# Patient Record
Sex: FEMALE | Race: OTHER | URBAN - METROPOLITAN AREA
[De-identification: names, ages, dates, MRNs, and addresses within clinical notes are randomized per-mention and may not be internally consistent; named-entity substitution may affect disease eponyms.]

---

## 2018-06-06 ENCOUNTER — OFFICE VISIT (OUTPATIENT)
Dept: FAMILY MEDICINE CLINIC | Age: 33
End: 2018-06-06

## 2018-06-06 VITALS
WEIGHT: 127.2 LBS | SYSTOLIC BLOOD PRESSURE: 100 MMHG | BODY MASS INDEX: 25.34 KG/M2 | HEART RATE: 96 BPM | TEMPERATURE: 98.8 F | OXYGEN SATURATION: 99 % | DIASTOLIC BLOOD PRESSURE: 68 MMHG

## 2018-06-06 DIAGNOSIS — H66.91 ACUTE RIGHT OTITIS MEDIA: ICD-10-CM

## 2018-06-06 DIAGNOSIS — R05.9 COUGH: Primary | ICD-10-CM

## 2018-06-06 PROCEDURE — 99213 OFFICE O/P EST LOW 20 MIN: CPT | Performed by: FAMILY MEDICINE

## 2018-06-06 RX ORDER — AMOXICILLIN AND CLAVULANATE POTASSIUM 875; 125 MG/1; MG/1
1 TABLET, FILM COATED ORAL 2 TIMES DAILY
Qty: 20 TABLET | Refills: 0 | Status: SHIPPED | OUTPATIENT
Start: 2018-06-06 | End: 2018-06-16

## 2018-06-06 RX ORDER — BENZONATATE 100 MG/1
100 CAPSULE ORAL 3 TIMES DAILY PRN
Qty: 20 CAPSULE | Refills: 0 | Status: SHIPPED | OUTPATIENT
Start: 2018-06-06 | End: 2018-06-13

## 2018-06-06 ASSESSMENT — PATIENT HEALTH QUESTIONNAIRE - PHQ9
SUM OF ALL RESPONSES TO PHQ QUESTIONS 1-9: 0
2. FEELING DOWN, DEPRESSED OR HOPELESS: 0
1. LITTLE INTEREST OR PLEASURE IN DOING THINGS: 0
SUM OF ALL RESPONSES TO PHQ9 QUESTIONS 1 & 2: 0

## 2021-06-27 ENCOUNTER — HOSPITAL ENCOUNTER (EMERGENCY)
Age: 36
Discharge: HOME | End: 2021-06-27

## 2021-06-27 ENCOUNTER — APPOINTMENT (OUTPATIENT)
Dept: ULTRASOUND IMAGING | Age: 36
End: 2021-06-27

## 2021-06-27 VITALS
BODY MASS INDEX: 26.68 KG/M2 | DIASTOLIC BLOOD PRESSURE: 49 MMHG | OXYGEN SATURATION: 99 % | RESPIRATION RATE: 16 BRPM | SYSTOLIC BLOOD PRESSURE: 99 MMHG | TEMPERATURE: 98.6 F | HEART RATE: 72 BPM | HEIGHT: 62 IN | WEIGHT: 145 LBS

## 2021-06-27 DIAGNOSIS — O46.90 VAGINAL BLEEDING IN PREGNANCY: Primary | ICD-10-CM

## 2021-06-27 LAB
ABO/RH: NORMAL
ANION GAP SERPL CALCULATED.3IONS-SCNC: 10 MMOL/L (ref 3–16)
BASOPHILS ABSOLUTE: 0 K/UL (ref 0–0.2)
BASOPHILS RELATIVE PERCENT: 0.5 %
BILIRUBIN URINE: NEGATIVE
BLOOD, URINE: NEGATIVE
BUN BLDV-MCNC: 13 MG/DL (ref 7–20)
CALCIUM SERPL-MCNC: 9.1 MG/DL (ref 8.3–10.6)
CHLORIDE BLD-SCNC: 103 MMOL/L (ref 99–110)
CLARITY: CLEAR
CO2: 22 MMOL/L (ref 21–32)
COLOR: YELLOW
CREAT SERPL-MCNC: <0.5 MG/DL (ref 0.6–1.1)
EOSINOPHILS ABSOLUTE: 0.2 K/UL (ref 0–0.6)
EOSINOPHILS RELATIVE PERCENT: 3 %
GFR AFRICAN AMERICAN: >60
GFR NON-AFRICAN AMERICAN: >60
GLUCOSE BLD-MCNC: 69 MG/DL (ref 70–99)
GLUCOSE URINE: 100 MG/DL
GONADOTROPIN, CHORIONIC (HCG) QUANT: NORMAL MIU/ML
HCT VFR BLD CALC: 36.4 % (ref 36–48)
HEMOGLOBIN: 12.2 G/DL (ref 12–16)
KETONES, URINE: ABNORMAL MG/DL
LEUKOCYTE ESTERASE, URINE: NEGATIVE
LYMPHOCYTES ABSOLUTE: 1.8 K/UL (ref 1–5.1)
LYMPHOCYTES RELATIVE PERCENT: 26.3 %
MCH RBC QN AUTO: 29.3 PG (ref 26–34)
MCHC RBC AUTO-ENTMCNC: 33.5 G/DL (ref 31–36)
MCV RBC AUTO: 87.7 FL (ref 80–100)
MICROSCOPIC EXAMINATION: ABNORMAL
MONOCYTES ABSOLUTE: 0.7 K/UL (ref 0–1.3)
MONOCYTES RELATIVE PERCENT: 10.1 %
NEUTROPHILS ABSOLUTE: 4.2 K/UL (ref 1.7–7.7)
NEUTROPHILS RELATIVE PERCENT: 60.1 %
NITRITE, URINE: NEGATIVE
PDW BLD-RTO: 13.8 % (ref 12.4–15.4)
PH UA: 6.5 (ref 5–8)
PLATELET # BLD: 245 K/UL (ref 135–450)
PMV BLD AUTO: 8 FL (ref 5–10.5)
POTASSIUM SERPL-SCNC: 4.1 MMOL/L (ref 3.5–5.1)
PROTEIN UA: NEGATIVE MG/DL
RBC # BLD: 4.16 M/UL (ref 4–5.2)
SODIUM BLD-SCNC: 135 MMOL/L (ref 136–145)
SPECIFIC GRAVITY UA: >1.03 (ref 1–1.03)
URINE REFLEX TO CULTURE: ABNORMAL
URINE TYPE: ABNORMAL
UROBILINOGEN, URINE: 1 E.U./DL
WBC # BLD: 6.9 K/UL (ref 4–11)

## 2021-06-27 PROCEDURE — 81003 URINALYSIS AUTO W/O SCOPE: CPT

## 2021-06-27 PROCEDURE — 86900 BLOOD TYPING SEROLOGIC ABO: CPT

## 2021-06-27 PROCEDURE — 80048 BASIC METABOLIC PNL TOTAL CA: CPT

## 2021-06-27 PROCEDURE — 85025 COMPLETE CBC W/AUTO DIFF WBC: CPT

## 2021-06-27 PROCEDURE — 99282 EMERGENCY DEPT VISIT SF MDM: CPT

## 2021-06-27 PROCEDURE — 86901 BLOOD TYPING SEROLOGIC RH(D): CPT

## 2021-06-27 PROCEDURE — 76817 TRANSVAGINAL US OBSTETRIC: CPT

## 2021-06-27 PROCEDURE — 84702 CHORIONIC GONADOTROPIN TEST: CPT

## 2021-06-27 ASSESSMENT — ENCOUNTER SYMPTOMS
VOMITING: 0
RHINORRHEA: 0
DIARRHEA: 0
COUGH: 0
ABDOMINAL PAIN: 0
SHORTNESS OF BREATH: 0
NAUSEA: 0

## 2021-06-27 NOTE — ED PROVIDER NOTES
[address] ENCOUNTER        Pt Name: Alonzo Camarillo  MRN: [id number]  [place] 1985  Date of evaluation: 6/27/2021  Provider: Skip Wright PA-C  PCP: Thomas Dawson MD  Note Started: 2:59 PM EDT       COLE. I have evaluated this patient. My supervising physician was available for consultation. CHIEF COMPLAINT       Chief Complaint   Patient presents with    Vaginal Bleeding     complains of vaginal bleeding onset yesterday. HISTORY OF PRESENT ILLNESS   (Location, Timing/Onset, Context/Setting, Quality, Duration, Modifying Factors, Severity, Associated Signs and Symptoms)  Note limiting factors. The patient's preferred language is Grenadian, language lines are used for interpretation,  #[id number]    Chief Complaint: Vaginal bleeding and pregnant    Alonzo Camarillo is a 38 y.o. female who presents to the emergency department today for evaluation for vaginal bleeding in pregnancy. The patient tells me that her last menstrual cycle was on 5/31/2021. The patient states that her menstrual cycle is late, and she states that today when she went to the bathroom, she states that she noticed some spotting. She states that she has not had any vaginal bleeding or discharge since. The patient states that she took a pregnancy test at home, which was positive. G5, P3. The patient does not have any abdominal pain or cramping. She has no nausea, vomiting or diarrhea. No fever chills per no cough or congestion. No urinary symptoms, no other complaints. Nursing Notes were all reviewed and agreed with or any disagreements were addressed in the HPI. REVIEW OF SYSTEMS    (2-9 systems for level 4, 10 or more for level 5)     Review of Systems   Constitutional: Negative for activity change, appetite change, chills and fever. HENT: Negative for congestion and rhinorrhea.     Respiratory: Negative for cough and shortness of breath. Cardiovascular: Negative for chest pain. Gastrointestinal: Negative for abdominal pain, diarrhea, nausea and vomiting. Genitourinary: Positive for vaginal bleeding. Negative for difficulty urinating, dysuria and hematuria. Neurological: Negative for weakness. Positives and Pertinent negatives as per HPI. Except as noted above in the ROS, all other systems were reviewed and negative. PAST MEDICAL HISTORY   History reviewed. No pertinent past medical history. SURGICAL HISTORY     Past Surgical History:   Procedure Laterality Date     SECTION           CURRENTMEDICATIONS       Previous Medications    No medications on file         ALLERGIES     Patient has no known allergies. FAMILYHISTORY       Family History   Family history unknown: Yes          SOCIAL HISTORY       Social History     Tobacco Use    Smoking status: Never Smoker    Smokeless tobacco: Never Used   Substance Use Topics    Alcohol use: No    Drug use: No       SCREENINGS             PHYSICAL EXAM    (up to 7 for level 4, 8 or more for level 5)     ED Triage Vitals [21 1122]   BP Temp Temp Source Pulse Resp SpO2 Height Weight   (!) 119/51 98.6 °F (37 °C) Oral 72 16 99 % 5' 2\" (1.575 m) 145 lb (65.8 kg)       Physical Exam  Vitals and nursing note reviewed. Constitutional:       Appearance: She is well-developed. She is not diaphoretic. HENT:      Head: Normocephalic and atraumatic. Right Ear: External ear normal.      Left Ear: External ear normal.      Nose: Nose normal.   Eyes:      General:         Right eye: No discharge. Left eye: No discharge. Neck:      Trachea: No tracheal deviation. Cardiovascular:      Rate and Rhythm: Normal rate and regular rhythm. Heart sounds: No murmur heard. Pulmonary:      Effort: Pulmonary effort is normal. No respiratory distress. Breath sounds: Normal breath sounds. No wheezing or rales.    Abdominal:      General: Bowel sounds are normal. There is no distension. Palpations: Abdomen is soft. Tenderness: There is no abdominal tenderness. There is no guarding. Genitourinary:     Comments: Chaperone present for exam, cervical os is closed. No vaginal bleeding noted   Musculoskeletal:         General: Normal range of motion. Cervical back: Normal range of motion and neck supple. Skin:     General: Skin is warm and dry. Neurological:      Mental Status: She is alert and oriented to person, place, and time. Psychiatric:         Behavior: Behavior normal.         DIAGNOSTIC RESULTS   LABS:    Labs Reviewed   BASIC METABOLIC PANEL - Abnormal; Notable for the following components:       Result Value    Sodium 135 (*)     Glucose 69 (*)     CREATININE <0.5 (*)     All other components within normal limits    Narrative:     Performed at:  [facility] [address]   Phone [phone]   URINE RT REFLEX TO CULTURE - Abnormal; Notable for the following components:    Glucose, Ur 100 (*)     Ketones, Urine TRACE (*)     All other components within normal limits    Narrative:     Performed at:  [facility] [address] [facility]   Phone [phone]   CBC WITH AUTO DIFFERENTIAL    Narrative:     Performed at:  [facility] [address] [facility]   Phone [phone]   HCG, QUANTITATIVE, PREGNANCY    Narrative:     Performed at:  [facility] [address] [place]   Phone [phone]   ABO/RH    Narrative:     Performed at:  [facility] [address] [facility]   Phone [phone]       All other labs were within normal range or not returned as of this dictation. EKG:  All EKG's are interpreted by the Emergency Department Physician in the absence of a cardiologist.  Please see their note for interpretation of EKG. RADIOLOGY:   Non-plain film images such as CT, Ultrasound and MRI are read by the radiologist. Plain radiographic images are visualized and preliminarily interpreted by the ED Provider with the below findings:        Interpretation per the Radiologist below, if available at the time of this note:    US OB TRANSVAGINAL   Final Result   1. Single live intrauterine gestation with an estimated fetal age of 10 weeks   and 6 days. US OB TRANSVAGINAL    Result Date: 6/27/2021  EXAMINATION: FIRST TRIMESTER OBSTETRIC ULTRASOUND 6/27/2021 TECHNIQUE: Transvaginal first trimester obstetric pelvic ultrasound was performed with color Doppler flow evaluation. COMPARISON: None HISTORY: ORDERING SYSTEM PROVIDED HISTORY: r/o ectopic TECHNOLOGIST PROVIDED HISTORY: Reason for exam:->r/o ectopic Reason for Exam: r/o ectopic Acuity: Acute Type of Exam: Initial Additional signs and symptoms: patient states vaginal bleed spotting x one day Relevant Medical/Surgical History: patient states here to confirm pregnancy FINDINGS: Uterus: The gravid uterus is unremarkable measuring 11.3 x 5.5 x 6.3 cm Gestational Sac(s):  Single normal appearing gestational sac. No evidence of subchorionic hemorrhage. Yolk Sac:  Present Fetal Pole:  Single fetal pole Crown Rump Length:  3 mm Fetal Heart Rate:  92 beats per minute Right ovary: 2.3 x 1.7 x 1.6 cm. There is no evidence of ovarian torsion. Left ovary: Not visualized. Free fluid: A trace amount of physiologic free fluid is present in the cul de sac. Measurements: Estimated gestational age by current ultrasound: 5 weeks and 6 days Estimated gestational by LMP/prior ultrasound: 3 weeks and 6 days Estimated Due Date: 02/21/2022     1. Single live intrauterine gestation with an estimated fetal age of 10 weeks and 6 days.            PROCEDURES   Unless otherwise noted below, none     Procedures    CRITICAL CARE TIME N/A    CONSULTS:  None      EMERGENCY DEPARTMENT COURSE and DIFFERENTIAL DIAGNOSIS/MDM:   Vitals:    Vitals:    06/27/21 1122 06/27/21 1445   BP: (!) 119/51 (!) 99/49   Pulse: 72    Resp: 16    Temp: 98.6 °F (37 °C)    TempSrc: Oral    SpO2: 99%    Weight: 145 lb (65.8 kg)    Height: 5' 2\" (1.575 m)        Patient was given the following medications:  Medications - No data to display        Briefly, this is a 70-year-old female who presents to the emergency department today for evaluation for vaginal bleeding in pregnancy. The patient tells me that her last menstrual cycle was on 5/31/2021, today she had some some spotting. She states that she took a pregnancy test which was positive. G5, P3. Patient has no abdominal pain. She does not feel that she is bleeding at this time. On physical exam, her cervical os is closed, there is no bleeding noted. Abdomen is benign. CBC shows no evidence of leukocytosis or anemia. Her BMP shows glucose of 69 however she is able to tolerate p.o. intake without difficulty. He[place] Mcdonald is [id number], she is O+. Urine shows no evidence of infection or blood    Ultrasound shows a single live IUP with estimated fetal age of 5 weeks and 6 days. Patient has not had any bleeding or cramping while in the emergency room she does have a confirmed IUP and therefore I feel that she can be managed as an outpatient. She states that she will obtain prenatal vitamins over-the-counter. She was referred to the [facility] clinic within the next 2 to 3 days for reevaluation. She is to return to the ED for any new or worsening symptoms. Patient voiced understanding is agreeable with plan. Stable for discharge. My suspicion is low at this time for ectopic pregnancy, tubo-ovarian abscess, ovarian torsion, PID, acute blood loss anemia or other emergent etiology      FINAL IMPRESSION      1.  Vaginal bleeding in pregnancy          DISPOSITION/PLAN   DISPOSITION Decision To Discharge 06/27/2021 03:07:24 PM      PATIENT REFERRED TO:  [facility] [address] [place] [address]  Schedule an appointment as soon as possible for a visit in 2 days      [facility] Emergency Department  [address] [facility]  [phone]    If symptoms worsen, As needed      DISCHARGE MEDICATIONS:  New Prescriptions    No medications on file       DISCONTINUED MEDICATIONS:  Discontinued Medications    No medications on file              (Please note that portions of this note were completed with a voice recognition program.  Efforts were made to edit the dictations but occasionally words are mis-transcribed.)    Ivette Contreras PA-C (electronically signed)            Ivette Contreras PA-C  06/27/21 [id number]

## 2021-06-27 NOTE — ED NOTES
Discharge instructions reviewed with pt. Pt allowed opportunity to ask questions and verbalized understanding.         Joey Doty RN  06/27/21 [id number]